# Patient Record
Sex: MALE | Race: OTHER | HISPANIC OR LATINO | URBAN - METROPOLITAN AREA
[De-identification: names, ages, dates, MRNs, and addresses within clinical notes are randomized per-mention and may not be internally consistent; named-entity substitution may affect disease eponyms.]

---

## 2017-01-23 ENCOUNTER — EMERGENCY (EMERGENCY)
Facility: HOSPITAL | Age: 47
LOS: 1 days | Discharge: HOME | End: 2017-01-23
Attending: EMERGENCY MEDICINE
Payer: MEDICAID

## 2017-01-23 VITALS
TEMPERATURE: 99 F | OXYGEN SATURATION: 97 % | SYSTOLIC BLOOD PRESSURE: 145 MMHG | RESPIRATION RATE: 18 BRPM | DIASTOLIC BLOOD PRESSURE: 84 MMHG | HEART RATE: 64 BPM

## 2017-01-23 DIAGNOSIS — M47.816 SPONDYLOSIS WITHOUT MYELOPATHY OR RADICULOPATHY, LUMBAR REGION: ICD-10-CM

## 2017-01-23 DIAGNOSIS — M54.5 LOW BACK PAIN: ICD-10-CM

## 2017-01-23 PROCEDURE — 99284 EMERGENCY DEPT VISIT MOD MDM: CPT

## 2017-01-23 PROCEDURE — T1013: CPT

## 2017-01-23 PROCEDURE — 99284 EMERGENCY DEPT VISIT MOD MDM: CPT | Mod: 25

## 2017-01-23 PROCEDURE — 72131 CT LUMBAR SPINE W/O DYE: CPT

## 2017-01-23 PROCEDURE — 72131 CT LUMBAR SPINE W/O DYE: CPT | Mod: 26

## 2017-01-23 NOTE — ED STATDOCS - OBJECTIVE STATEMENT
45 y/o male presents to ED c/o lower back pain s/p fall ~4 months ago, progressively increasing in intensity x 2 weeks. Pain does not radiate. No subsequent injuries or recent heavy lifting. Denies bowel/bladder incontinence, testicular numbness. Pt has been seeing a chiropractor and physical therapist with minimal relief of pain. He also received recent L-spine XRs, which showed "a line in his lower back". Pt also reports tactile fever 4 days ago x 1 day, no fever since. No further complaints at this time.  Abby Costello utilized to obtain history.

## 2017-01-23 NOTE — ED STATDOCS - NS ED MD SCRIBE ATTENDING SCRIBE SECTIONS
VITAL SIGNS( Pullset)/REVIEW OF SYSTEMS/PAST MEDICAL/SURGICAL/SOCIAL HISTORY/PHYSICAL EXAM/DISPOSITION/HIV/HISTORY OF PRESENT ILLNESS

## 2017-01-23 NOTE — ED ADULT TRIAGE NOTE - CHIEF COMPLAINT QUOTE
Patient arrived to ED today with c/o fall off a fence about 3 feet four months ago.  Patient states he went to a Chiropractor and to PT and still has pain and discomfort.

## 2017-01-23 NOTE — ED STATDOCS - PROGRESS NOTE DETAILS
PA NOTE: Pt seen by intake physician and orders/plan reviewed. PT presenting to ED with complaints of  PE: GEN: Awake, alert,  NAD,  EYES: PERRL CARDIAC: Reg rate and rhythm, S1,S2, RRR  RESP: No distress noted. Lungs CTA bilaterally no wheeze, ronchi, rales. ABD: soft, supple, non-tender, no guarding. . NEURO: AOx3, no focal deficits   PLAN: PA NOTE: Pt seen by intake physician and orders/plan reviewed. PT is a 45yo male with no significant PMHX presenting to ED with complaints of lower back pain x 4 months s/p fall off ladder (4ft) on to hit butt. Pt reports pain has increased within the past few weeks. Pt reports being seen by "specialist" for pain mgmt therapy. Pt endorses abnormal xray findings at [facility] post fall. Pt has been using advil to relieve symptoms. Pt endorses pulsating constant nonradiating pain rated 8/10 exacerbated by walking and bending. Pt denies fever, chills, dizziness, head trauma, LOC, numbness or tingling of extremities, bowel or bladder inconstance or retention, saddle anesthesia, hematuria, NKDA  PE: GEN: Awake, alert,  NAD,  EYES: PERRL CARDIAC: Reg rate and rhythm, S1,S2, RRR  RESP: No distress noted. Lungs CTA bilaterally no wheeze, ronchi, rales. ABD: soft, supple, non-tender, no guarding. BACK: + lumbar vertebral and paravertebral tenderness. No ena abnormality or ecchymosis noted. FROM of back. 5/5 strength in BL LE. sensation grossly intact x 4. NEURO: AOx3, no focal deficits   PLAN: Pt is a 45yo male with back pain. CT pending. will re-evaluate.  used. Pt improved. 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 Pt advised to follow up with spine center. Pt advised to take motrin for pain. Will give pt copy of CT results. Pt verbalized understanding and agreement with plan and diagnosis. will d/c pt.  used. Discussed with attending.

## 2017-01-23 NOTE — ED STATDOCS - ATTENDING CONTRIBUTION TO CARE
I, Desmond Stafford, performed the initial face to face bedside interview with this patient regarding history of present illness, review of symptoms and relevant past medical, social and family history.  I completed an independent physical examination.  I was the provider who initially evaluated this patient.  The history, relevant review of systems, past medical and surgical history, medical decision making, and physical examination was documented by the scribe in my presence and I attest to the accuracy of the documentation. Follow-up on ordered tests (ie labs, radiologic studies) and re-evaluation of the patient's status has been communicated to the ACP.  Disposition of the patient will be based on test outcome and response to ED interventions.
